# Patient Record
Sex: MALE | Race: WHITE | NOT HISPANIC OR LATINO
[De-identification: names, ages, dates, MRNs, and addresses within clinical notes are randomized per-mention and may not be internally consistent; named-entity substitution may affect disease eponyms.]

---
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
## 2024-02-23 ENCOUNTER — APPOINTMENT (OUTPATIENT)
Dept: PEDIATRICS | Facility: CLINIC | Age: 19
End: 2024-02-23
Payer: COMMERCIAL

## 2024-02-23 VITALS — WEIGHT: 157.8 LBS | TEMPERATURE: 98.4 F

## 2024-02-23 DIAGNOSIS — I86.1 SCROTAL VARICES: ICD-10-CM

## 2024-02-23 PROCEDURE — 99214 OFFICE O/P EST MOD 30 MIN: CPT

## 2024-02-23 NOTE — REVIEW OF SYSTEMS
[Fever] : no fever [Cough] : no cough [Nasal Congestion] : no nasal congestion [Appetite Changes] : no appetite changes [Vomiting] : no vomiting [Diarrhea] : no diarrhea [Dysuria] : no dysuria

## 2024-02-23 NOTE — HISTORY OF PRESENT ILLNESS
[FreeTextEntry6] : Pt c/o stomach sounding loud X 1month, eating breakfast/lunch/dinner; intermittently bloated feeding- no n/v/c/d, no fevers, stooling daily- easy to go, no blood or mucous stool; living at home for college- no diet changes, no ETOH or drug use.  fhtx: no crohns, no UC, no celiac disease fern of varicocele- needs new referral  needs albuterol refill [de-identified] : GI issues, stomach makes very loud noises randomly sometimes has pain and bloating.

## 2024-02-23 NOTE — DISCUSSION/SUMMARY
[FreeTextEntry1] : D/W patient/parent c/o abdominal pain, most likely gastritis- will obtain labwork as below and start famotidine OTC; continue 3meals 2 snacks as tolerated, plenty of water; monitor for poor PO intake, dehydration, vomiting/diarrhea/fever and call if occuring for recheck. fern of varicocele- urology referral put in today.  renewed albuterol as requested.

## 2024-03-30 ENCOUNTER — APPOINTMENT (OUTPATIENT)
Dept: PEDIATRICS | Facility: CLINIC | Age: 19
End: 2024-03-30
Payer: COMMERCIAL

## 2024-03-30 VITALS
DIASTOLIC BLOOD PRESSURE: 60 MMHG | SYSTOLIC BLOOD PRESSURE: 112 MMHG | HEART RATE: 92 BPM | HEIGHT: 74 IN | WEIGHT: 156 LBS | BODY MASS INDEX: 20.02 KG/M2

## 2024-03-30 DIAGNOSIS — Z00.00 ENCOUNTER FOR GENERAL ADULT MEDICAL EXAMINATION W/OUT ABNORMAL FINDINGS: ICD-10-CM

## 2024-03-30 DIAGNOSIS — R14.0 ABDOMINAL DISTENSION (GASEOUS): ICD-10-CM

## 2024-03-30 PROCEDURE — 99173 VISUAL ACUITY SCREEN: CPT | Mod: 59

## 2024-03-30 PROCEDURE — 96127 BRIEF EMOTIONAL/BEHAV ASSMT: CPT

## 2024-03-30 PROCEDURE — 96160 PT-FOCUSED HLTH RISK ASSMT: CPT | Mod: 59

## 2024-03-30 PROCEDURE — 92551 PURE TONE HEARING TEST AIR: CPT

## 2024-03-30 PROCEDURE — 99395 PREV VISIT EST AGE 18-39: CPT

## 2024-03-30 RX ORDER — ALBUTEROL SULFATE 90 UG/1
108 (90 BASE) INHALANT RESPIRATORY (INHALATION)
Qty: 1 | Refills: 2 | Status: ACTIVE | COMMUNITY
Start: 2021-02-09 | End: 1900-01-01

## 2024-03-30 NOTE — HISTORY OF PRESENT ILLNESS
[Mother] : mother [Needs Immunizations] : needs immunizations [Eats meals with family] : eats meals with family [Eats regular meals including adequate fruits and vegetables] : eats regular meals including adequate fruits and vegetables [Normal Performance] : normal performance [Sleep Concerns] : no sleep concerns [Has concerns about body or appearance] : does not have concerns about body or appearance [At least 1 hour of physical activity a day] : at least 1 hour of physical activity a day [Uses electronic nicotine delivery system] : does not use electronic nicotine delivery system [Uses tobacco] : does not use tobacco [No] : No cigarette smoke exposure [Drinks alcohol] : drinks alcohol [Uses drugs] : does not use drugs  [Always] : Condom use: always [Yes] : Patient has had sexual intercourse. [Has problems with sleep] : does not have problems with sleep [Has thought about hurting self or considered suicide] : has not thought about hurting self or considered suicide [Gets depressed, anxious, or irritable/has mood swings] : does not get depressed, anxious, or irritable/has mood swings [de-identified] : socially, occasionally and safely [de-identified] : declined testing  [FreeTextEntry7] : 18 Y[facility] [FreeTextEntry1] : parent/patient denies- night sweats, night pains,  unexplained weight loss, headache, chest pain, SOB, loss of energy, chronic joint pains patient has normal urine output and stooling mild asthma- uses inhaler with sports mostly when in a [employer]  stomach has been making loud noises despite eating or not- it gets bloated and uncomfortable at times, it affects him at school when quiet and he feels self conscious   6

## 2024-03-30 NOTE — DISCUSSION/SUMMARY
[FreeTextEntry1] : Continue and/or try to have a balanced diet with all food groups. Brush teeth twice a day with toothbrush. Recommend visit to dentist. Maintain consistent daily routines and sleep schedule. Risky behaviors assessed. School discussed. Try to limit screen time to no more than 2 hours per day. Encourage physical activity. Return 1 year for routine well visit check. coordination of care reviewed 5-2-1-0 reviewed cardiac checklist -reviewed CRAFFT screening was reviewed refer to GI

## 2024-03-30 NOTE — PHYSICAL EXAM
[Alert] : alert [EOMI Bilateral] : EOMI bilateral [Normocephalic] : normocephalic [No Acute Distress] : no acute distress [Pink Nasal Mucosa] : pink nasal mucosa [Clear tympanic membranes with bony landmarks and light reflex present bilaterally] : clear tympanic membranes with bony landmarks and light reflex present bilaterally  [Nonerythematous Oropharynx] : nonerythematous oropharynx [Clear to Auscultation Bilaterally] : clear to auscultation bilaterally [Supple, full passive range of motion] : supple, full passive range of motion [No Palpable Masses] : no palpable masses [Normal S1, S2 audible] : normal S1, S2 audible [No Murmurs] : no murmurs [Regular Rate and Rhythm] : regular rate and rhythm [Soft] : soft [NonTender] : non tender [Non Distended] : non distended [No Hepatomegaly] : no hepatomegaly [Mathieu: _____] : Mathieu [unfilled] [No Splenomegaly] : no splenomegaly [No Testicular Masses] : no testicular masses [Bilateral descended testes] : bilateral descended testes [No Abnormal Lymph Nodes Palpated] : no abnormal lymph nodes palpated [Normal Muscle Tone] : normal muscle tone [No Gait Asymmetry] : no gait asymmetry [No pain or deformities with palpation of bone, muscles, joints] : no pain or deformities with palpation of bone, muscles, joints [Cranial Nerves Grossly Intact] : cranial nerves grossly intact [Straight] : straight [FreeTextEntry6] : small varicocele left  [No Rash or Lesions] : no rash or lesions

## 2024-12-30 ENCOUNTER — APPOINTMENT (OUTPATIENT)
Dept: PEDIATRICS | Facility: CLINIC | Age: 19
End: 2024-12-30
Payer: COMMERCIAL

## 2024-12-30 VITALS — WEIGHT: 165.3 LBS | TEMPERATURE: 97.2 F

## 2024-12-30 DIAGNOSIS — M54.50 LOW BACK PAIN, UNSPECIFIED: ICD-10-CM

## 2024-12-30 PROCEDURE — 99213 OFFICE O/P EST LOW 20 MIN: CPT

## 2025-03-02 ENCOUNTER — NON-APPOINTMENT (OUTPATIENT)
Age: 20
End: 2025-03-02